# Patient Record
Sex: FEMALE | Race: WHITE | NOT HISPANIC OR LATINO | Employment: FULL TIME | URBAN - METROPOLITAN AREA
[De-identification: names, ages, dates, MRNs, and addresses within clinical notes are randomized per-mention and may not be internally consistent; named-entity substitution may affect disease eponyms.]

---

## 2017-03-20 ENCOUNTER — HOSPITAL ENCOUNTER (EMERGENCY)
Facility: HOSPITAL | Age: 27
Discharge: HOME | End: 2017-03-20
Attending: EMERGENCY MEDICINE | Admitting: EMERGENCY MEDICINE

## 2017-03-20 ENCOUNTER — APPOINTMENT (OUTPATIENT)
Dept: GENERAL RADIOLOGY | Facility: HOSPITAL | Age: 27
End: 2017-03-20

## 2017-03-20 VITALS
SYSTOLIC BLOOD PRESSURE: 120 MMHG | RESPIRATION RATE: 14 BRPM | OXYGEN SATURATION: 96 % | DIASTOLIC BLOOD PRESSURE: 81 MMHG | HEIGHT: 64 IN | TEMPERATURE: 97.8 F | WEIGHT: 133 LBS | HEART RATE: 82 BPM | BODY MASS INDEX: 22.71 KG/M2

## 2017-03-20 DIAGNOSIS — S90.852A FOREIGN BODY IN FOOT, LEFT, INITIAL ENCOUNTER: Primary | ICD-10-CM

## 2017-03-20 LAB
B-HCG UR QL: NEGATIVE
INTERNAL NEGATIVE CONTROL: NEGATIVE
INTERNAL POSITIVE CONTROL: POSITIVE
Lab: NORMAL

## 2017-03-20 PROCEDURE — 73630 X-RAY EXAM OF FOOT: CPT

## 2017-03-20 PROCEDURE — 99282 EMERGENCY DEPT VISIT SF MDM: CPT

## 2017-03-20 RX ORDER — DOXYCYCLINE 100 MG/1
100 CAPSULE ORAL 2 TIMES DAILY
Qty: 10 CAPSULE | Refills: 0 | Status: SHIPPED | OUTPATIENT
Start: 2017-03-20 | End: 2020-08-31

## 2017-03-20 NOTE — ED PROVIDER NOTES
Subjective   Patient is a 26 y.o. female presenting with foreign body.   History provided by:  Patient   used: No    Foreign Body   Intake: left foot.  Thinks stepped on broken beer bottle glass on sidewalk.  Suspected object:  Glass  Pain quality:  Sharp  Pain severity:  Mild  Duration:  2 days  Timing:  Constant  Progression:  Unchanged  Chronicity:  New  Exacerbated by: walking.  Ineffective treatments: pt has tried to dig out herself but can not visualize at all.   Associated symptoms comment:  None        Review of Systems   Constitutional: Negative for chills and fever.   Respiratory: Negative for chest tightness.    Cardiovascular: Negative for chest pain and palpitations.   All other systems reviewed and are negative.      History reviewed. No pertinent past medical history.    Allergies   Allergen Reactions   • Penicillins Hives       Past Surgical History   Procedure Laterality Date   • Knee surgery         History reviewed. No pertinent family history.    Social History     Social History   • Marital status: Single     Spouse name: N/A   • Number of children: N/A   • Years of education: N/A     Social History Main Topics   • Smoking status: Never Smoker   • Smokeless tobacco: None   • Alcohol use Yes   • Drug use: No   • Sexual activity: Defer     Other Topics Concern   • None     Social History Narrative   • None           Objective   Physical Exam   Constitutional: She is oriented to person, place, and time. She appears well-developed and well-nourished.   HENT:   Head: Normocephalic.   Right Ear: External ear normal.   Left Ear: External ear normal.   Nose: Nose normal.   Eyes: Conjunctivae and EOM are normal. Pupils are equal, round, and reactive to light.   Neck: Normal range of motion.   Musculoskeletal: Normal range of motion.   Neurological: She is alert and oriented to person, place, and time.   Skin: Skin is warm and dry.    Bottom of left foot towards 5th metatarsal noted to  have open wound but no redness induration.    Psychiatric: She has a normal mood and affect. Her behavior is normal. Judgment and thought content normal.       Procedures         ED Course  ED Course                  MDM      Final diagnoses:   Foreign body in foot, left, initial encounter            MARKEL Castro  03/21/17 2911

## 2018-05-01 ENCOUNTER — TRANSCRIBE ORDERS (OUTPATIENT)
Dept: LAB | Facility: HOSPITAL | Age: 28
End: 2018-05-01

## 2018-05-01 ENCOUNTER — LAB (OUTPATIENT)
Dept: LAB | Facility: HOSPITAL | Age: 28
End: 2018-05-01

## 2018-05-01 DIAGNOSIS — N18.9 CHRONIC KIDNEY DISEASE, UNSPECIFIED CKD STAGE: ICD-10-CM

## 2018-05-01 DIAGNOSIS — N18.9 CHRONIC KIDNEY DISEASE, UNSPECIFIED CKD STAGE: Primary | ICD-10-CM

## 2018-05-01 LAB
ALBUMIN SERPL-MCNC: 4 G/DL (ref 3.2–4.8)
ANION GAP SERPL CALCULATED.3IONS-SCNC: 6 MMOL/L (ref 3–11)
BACTERIA UR QL AUTO: ABNORMAL /HPF
BASOPHILS # BLD AUTO: 0.04 10*3/MM3 (ref 0–0.2)
BASOPHILS NFR BLD AUTO: 0.6 % (ref 0–1)
BILIRUB UR QL STRIP: NEGATIVE
BUN BLD-MCNC: 13 MG/DL (ref 9–23)
BUN/CREAT SERPL: 18.6 (ref 7–25)
CALCIUM SPEC-SCNC: 9.6 MG/DL (ref 8.7–10.4)
CHLORIDE SERPL-SCNC: 100 MMOL/L (ref 99–109)
CLARITY UR: CLEAR
CO2 SERPL-SCNC: 30 MMOL/L (ref 20–31)
COLOR UR: YELLOW
CREAT BLD-MCNC: 0.7 MG/DL (ref 0.6–1.3)
CREAT UR-MCNC: 98.8 MG/DL
DEPRECATED RDW RBC AUTO: 42.2 FL (ref 37–54)
EOSINOPHIL # BLD AUTO: 0.08 10*3/MM3 (ref 0–0.3)
EOSINOPHIL NFR BLD AUTO: 1.1 % (ref 0–3)
ERYTHROCYTE [DISTWIDTH] IN BLOOD BY AUTOMATED COUNT: 12.7 % (ref 11.3–14.5)
GFR SERPL CREATININE-BSD FRML MDRD: 101 ML/MIN/1.73
GLUCOSE BLD-MCNC: 81 MG/DL (ref 70–100)
GLUCOSE UR STRIP-MCNC: NEGATIVE MG/DL
HCT VFR BLD AUTO: 33.1 % (ref 34.5–44)
HGB BLD-MCNC: 11.3 G/DL (ref 11.5–15.5)
HGB UR QL STRIP.AUTO: ABNORMAL
HYALINE CASTS UR QL AUTO: ABNORMAL /LPF
IMM GRANULOCYTES # BLD: 0.01 10*3/MM3 (ref 0–0.03)
IMM GRANULOCYTES NFR BLD: 0.1 % (ref 0–0.6)
KETONES UR QL STRIP: NEGATIVE
LEUKOCYTE ESTERASE UR QL STRIP.AUTO: NEGATIVE
LYMPHOCYTES # BLD AUTO: 2.56 10*3/MM3 (ref 0.6–4.8)
LYMPHOCYTES NFR BLD AUTO: 35.7 % (ref 24–44)
MCH RBC QN AUTO: 30.9 PG (ref 27–31)
MCHC RBC AUTO-ENTMCNC: 34.1 G/DL (ref 32–36)
MCV RBC AUTO: 90.4 FL (ref 80–99)
MONOCYTES # BLD AUTO: 0.49 10*3/MM3 (ref 0–1)
MONOCYTES NFR BLD AUTO: 6.8 % (ref 0–12)
NEUTROPHILS # BLD AUTO: 4 10*3/MM3 (ref 1.5–8.3)
NEUTROPHILS NFR BLD AUTO: 55.7 % (ref 41–71)
NITRITE UR QL STRIP: NEGATIVE
PH UR STRIP.AUTO: <=5 [PH] (ref 5–8)
PHOSPHATE SERPL-MCNC: 4.9 MG/DL (ref 2.4–5.1)
PLATELET # BLD AUTO: 351 10*3/MM3 (ref 150–450)
PMV BLD AUTO: 9.4 FL (ref 6–12)
POTASSIUM BLD-SCNC: 4.2 MMOL/L (ref 3.5–5.5)
PROT UR QL STRIP: ABNORMAL
PROT UR-MCNC: 136 MG/DL (ref 1–14)
RBC # BLD AUTO: 3.66 10*6/MM3 (ref 3.89–5.14)
RBC # UR: ABNORMAL /HPF
REF LAB TEST METHOD: ABNORMAL
SODIUM BLD-SCNC: 136 MMOL/L (ref 132–146)
SP GR UR STRIP: 1.02 (ref 1–1.03)
SQUAMOUS #/AREA URNS HPF: ABNORMAL /HPF
UROBILINOGEN UR QL STRIP: ABNORMAL
WBC NRBC COR # BLD: 7.18 10*3/MM3 (ref 3.5–10.8)
WBC UR QL AUTO: ABNORMAL /HPF

## 2018-05-01 PROCEDURE — 36415 COLL VENOUS BLD VENIPUNCTURE: CPT

## 2018-05-01 PROCEDURE — 85025 COMPLETE CBC W/AUTO DIFF WBC: CPT

## 2018-05-01 PROCEDURE — 80069 RENAL FUNCTION PANEL: CPT

## 2018-05-01 PROCEDURE — 84156 ASSAY OF PROTEIN URINE: CPT

## 2018-05-01 PROCEDURE — 82570 ASSAY OF URINE CREATININE: CPT

## 2018-05-01 PROCEDURE — 81001 URINALYSIS AUTO W/SCOPE: CPT

## 2018-06-11 ENCOUNTER — LAB (OUTPATIENT)
Dept: LAB | Facility: HOSPITAL | Age: 28
End: 2018-06-11

## 2018-06-11 ENCOUNTER — TRANSCRIBE ORDERS (OUTPATIENT)
Dept: LAB | Facility: HOSPITAL | Age: 28
End: 2018-06-11

## 2018-06-11 DIAGNOSIS — I13.10 BENIGN HYPERTENSIVE HEART AND RENAL DISEASE: Primary | ICD-10-CM

## 2018-06-11 DIAGNOSIS — I13.10 BENIGN HYPERTENSIVE HEART AND RENAL DISEASE: ICD-10-CM

## 2018-06-11 LAB
ALBUMIN SERPL-MCNC: 3.81 G/DL (ref 3.2–4.8)
ANION GAP SERPL CALCULATED.3IONS-SCNC: 10 MMOL/L (ref 3–11)
BACTERIA UR QL AUTO: ABNORMAL /HPF
BASOPHILS # BLD AUTO: 0.03 10*3/MM3 (ref 0–0.2)
BASOPHILS NFR BLD AUTO: 0.4 % (ref 0–1)
BILIRUB UR QL STRIP: NEGATIVE
BUN BLD-MCNC: 12 MG/DL (ref 9–23)
BUN/CREAT SERPL: 16.9 (ref 7–25)
CALCIUM SPEC-SCNC: 8.2 MG/DL (ref 8.7–10.4)
CHLORIDE SERPL-SCNC: 101 MMOL/L (ref 99–109)
CLARITY UR: CLEAR
CO2 SERPL-SCNC: 28 MMOL/L (ref 20–31)
COLOR UR: YELLOW
CREAT BLD-MCNC: 0.71 MG/DL (ref 0.6–1.3)
CREAT UR-MCNC: 151.6 MG/DL
DEPRECATED RDW RBC AUTO: 45.1 FL (ref 37–54)
EOSINOPHIL # BLD AUTO: 0.09 10*3/MM3 (ref 0–0.3)
EOSINOPHIL NFR BLD AUTO: 1.1 % (ref 0–3)
ERYTHROCYTE [DISTWIDTH] IN BLOOD BY AUTOMATED COUNT: 13.5 % (ref 11.3–14.5)
GFR SERPL CREATININE-BSD FRML MDRD: 100 ML/MIN/1.73
GLUCOSE BLD-MCNC: 89 MG/DL (ref 70–100)
GLUCOSE UR STRIP-MCNC: NEGATIVE MG/DL
HCT VFR BLD AUTO: 33.4 % (ref 34.5–44)
HGB BLD-MCNC: 11.2 G/DL (ref 11.5–15.5)
HGB UR QL STRIP.AUTO: ABNORMAL
HYALINE CASTS UR QL AUTO: ABNORMAL /LPF
IMM GRANULOCYTES # BLD: 0.01 10*3/MM3 (ref 0–0.03)
IMM GRANULOCYTES NFR BLD: 0.1 % (ref 0–0.6)
KETONES UR QL STRIP: NEGATIVE
LEUKOCYTE ESTERASE UR QL STRIP.AUTO: NEGATIVE
LYMPHOCYTES # BLD AUTO: 3.03 10*3/MM3 (ref 0.6–4.8)
LYMPHOCYTES NFR BLD AUTO: 38.5 % (ref 24–44)
MCH RBC QN AUTO: 30.9 PG (ref 27–31)
MCHC RBC AUTO-ENTMCNC: 33.5 G/DL (ref 32–36)
MCV RBC AUTO: 92 FL (ref 80–99)
MONOCYTES # BLD AUTO: 0.47 10*3/MM3 (ref 0–1)
MONOCYTES NFR BLD AUTO: 6 % (ref 0–12)
MUCOUS THREADS URNS QL MICRO: ABNORMAL /HPF
NEUTROPHILS # BLD AUTO: 4.24 10*3/MM3 (ref 1.5–8.3)
NEUTROPHILS NFR BLD AUTO: 54 % (ref 41–71)
NITRITE UR QL STRIP: NEGATIVE
PH UR STRIP.AUTO: 5.5 [PH] (ref 5–8)
PHOSPHATE SERPL-MCNC: 2.8 MG/DL (ref 2.4–5.1)
PLATELET # BLD AUTO: 296 10*3/MM3 (ref 150–450)
PMV BLD AUTO: 9.7 FL (ref 6–12)
POTASSIUM BLD-SCNC: 3.6 MMOL/L (ref 3.5–5.5)
PROT UR QL STRIP: ABNORMAL
PROT UR-MCNC: 192 MG/DL (ref 1–14)
RBC # BLD AUTO: 3.63 10*6/MM3 (ref 3.89–5.14)
RBC # UR: ABNORMAL /HPF
REF LAB TEST METHOD: ABNORMAL
SODIUM BLD-SCNC: 139 MMOL/L (ref 132–146)
SP GR UR STRIP: 1.02 (ref 1–1.03)
SQUAMOUS #/AREA URNS HPF: ABNORMAL /HPF
UROBILINOGEN UR QL STRIP: ABNORMAL
WBC NRBC COR # BLD: 7.86 10*3/MM3 (ref 3.5–10.8)
WBC UR QL AUTO: ABNORMAL /HPF
YEAST URNS QL MICRO: ABNORMAL /HPF

## 2018-06-11 PROCEDURE — 82570 ASSAY OF URINE CREATININE: CPT

## 2018-06-11 PROCEDURE — 80069 RENAL FUNCTION PANEL: CPT

## 2018-06-11 PROCEDURE — 84156 ASSAY OF PROTEIN URINE: CPT

## 2018-06-11 PROCEDURE — 85025 COMPLETE CBC W/AUTO DIFF WBC: CPT

## 2018-06-11 PROCEDURE — 81001 URINALYSIS AUTO W/SCOPE: CPT

## 2018-06-11 PROCEDURE — 36415 COLL VENOUS BLD VENIPUNCTURE: CPT

## 2018-06-12 ENCOUNTER — TRANSCRIBE ORDERS (OUTPATIENT)
Dept: ADMINISTRATIVE | Facility: HOSPITAL | Age: 28
End: 2018-06-12

## 2018-06-12 DIAGNOSIS — N18.1 STAGE 1 CHRONIC KIDNEY DISEASE: Primary | ICD-10-CM

## 2018-07-12 ENCOUNTER — HOSPITAL ENCOUNTER (OUTPATIENT)
Dept: CT IMAGING | Facility: HOSPITAL | Age: 28
Discharge: HOME | End: 2018-07-13
Attending: INTERNAL MEDICINE | Admitting: INTERNAL MEDICINE

## 2018-07-12 DIAGNOSIS — N18.1 STAGE 1 CHRONIC KIDNEY DISEASE: ICD-10-CM

## 2018-07-12 PROBLEM — R80.9 PROTEINURIA: Status: ACTIVE | Noted: 2018-07-12

## 2018-07-12 LAB
ABO GROUP BLD: NORMAL
ABO GROUP BLD: NORMAL
ALBUMIN SERPL-MCNC: 3.92 G/DL (ref 3.2–4.8)
ANION GAP SERPL CALCULATED.3IONS-SCNC: 3 MMOL/L (ref 3–11)
APTT PPP: 27.3 SECONDS (ref 24–31)
B-HCG UR QL: NEGATIVE
BLD GP AB SCN SERPL QL: NEGATIVE
BUN BLD-MCNC: 13 MG/DL (ref 9–23)
BUN/CREAT SERPL: 18.6 (ref 7–25)
CALCIUM SPEC-SCNC: 8.9 MG/DL (ref 8.7–10.4)
CHLORIDE SERPL-SCNC: 110 MMOL/L (ref 99–109)
CLOSURE TME COLL+ADP BLD: 114 SECONDS (ref 54–123)
CLOSURE TME COLL+EPINEP BLD: 103 SECONDS (ref 72–192)
CO2 SERPL-SCNC: 27 MMOL/L (ref 20–31)
CREAT BLD-MCNC: 0.7 MG/DL (ref 0.6–1.3)
DEPRECATED RDW RBC AUTO: 42.5 FL (ref 37–54)
ERYTHROCYTE [DISTWIDTH] IN BLOOD BY AUTOMATED COUNT: 12.7 % (ref 11.3–14.5)
GFR SERPL CREATININE-BSD FRML MDRD: 101 ML/MIN/1.73
GLUCOSE BLD-MCNC: 89 MG/DL (ref 70–100)
HCT VFR BLD AUTO: 37.1 % (ref 34.5–44)
HGB BLD-MCNC: 11.9 G/DL (ref 11.5–15.5)
HGB BLD-MCNC: 12.5 G/DL (ref 11.5–15.5)
INR PPP: 0.91 (ref 0.91–1.09)
MCH RBC QN AUTO: 30.8 PG (ref 27–31)
MCHC RBC AUTO-ENTMCNC: 33.7 G/DL (ref 32–36)
MCV RBC AUTO: 91.4 FL (ref 80–99)
PHOSPHATE SERPL-MCNC: 3.8 MG/DL (ref 2.4–5.1)
PLATELET # BLD AUTO: 292 10*3/MM3 (ref 150–450)
PMV BLD AUTO: 9.7 FL (ref 6–12)
POTASSIUM BLD-SCNC: 4.2 MMOL/L (ref 3.5–5.5)
PROTHROMBIN TIME: 9.6 SECONDS (ref 9.6–11.5)
RBC # BLD AUTO: 4.06 10*6/MM3 (ref 3.89–5.14)
RH BLD: POSITIVE
RH BLD: POSITIVE
SODIUM BLD-SCNC: 140 MMOL/L (ref 132–146)
T&S EXPIRATION DATE: NORMAL
WBC NRBC COR # BLD: 5.27 10*3/MM3 (ref 3.5–10.8)

## 2018-07-12 PROCEDURE — G0378 HOSPITAL OBSERVATION PER HR: HCPCS

## 2018-07-12 PROCEDURE — 88305 TISSUE EXAM BY PATHOLOGIST: CPT | Performed by: INTERNAL MEDICINE

## 2018-07-12 PROCEDURE — 86901 BLOOD TYPING SEROLOGIC RH(D): CPT

## 2018-07-12 PROCEDURE — 85730 THROMBOPLASTIN TIME PARTIAL: CPT | Performed by: INTERNAL MEDICINE

## 2018-07-12 PROCEDURE — 88350 IMFLUOR EA ADDL 1ANTB STN PX: CPT | Performed by: INTERNAL MEDICINE

## 2018-07-12 PROCEDURE — 85576 BLOOD PLATELET AGGREGATION: CPT | Performed by: INTERNAL MEDICINE

## 2018-07-12 PROCEDURE — 85027 COMPLETE CBC AUTOMATED: CPT | Performed by: INTERNAL MEDICINE

## 2018-07-12 PROCEDURE — 88348 ELECTRON MICROSCOPY DX: CPT | Performed by: INTERNAL MEDICINE

## 2018-07-12 PROCEDURE — 88313 SPECIAL STAINS GROUP 2: CPT | Performed by: INTERNAL MEDICINE

## 2018-07-12 PROCEDURE — 85610 PROTHROMBIN TIME: CPT | Performed by: INTERNAL MEDICINE

## 2018-07-12 PROCEDURE — 86900 BLOOD TYPING SEROLOGIC ABO: CPT

## 2018-07-12 PROCEDURE — 81025 URINE PREGNANCY TEST: CPT | Performed by: INTERNAL MEDICINE

## 2018-07-12 PROCEDURE — 77012 CT SCAN FOR NEEDLE BIOPSY: CPT

## 2018-07-12 PROCEDURE — 88346 IMFLUOR 1ST 1ANTB STAIN PX: CPT | Performed by: INTERNAL MEDICINE

## 2018-07-12 PROCEDURE — 80069 RENAL FUNCTION PANEL: CPT | Performed by: INTERNAL MEDICINE

## 2018-07-12 PROCEDURE — 86850 RBC ANTIBODY SCREEN: CPT | Performed by: INTERNAL MEDICINE

## 2018-07-12 PROCEDURE — 86900 BLOOD TYPING SEROLOGIC ABO: CPT | Performed by: INTERNAL MEDICINE

## 2018-07-12 PROCEDURE — 86901 BLOOD TYPING SEROLOGIC RH(D): CPT | Performed by: INTERNAL MEDICINE

## 2018-07-12 PROCEDURE — 85018 HEMOGLOBIN: CPT | Performed by: INTERNAL MEDICINE

## 2018-07-12 RX ORDER — SODIUM CHLORIDE 0.9 % (FLUSH) 0.9 %
1-10 SYRINGE (ML) INJECTION AS NEEDED
Status: DISCONTINUED | OUTPATIENT
Start: 2018-07-12 | End: 2018-07-13 | Stop reason: HOSPADM

## 2018-07-12 RX ORDER — ACETAMINOPHEN 325 MG/1
650 TABLET ORAL EVERY 4 HOURS PRN
Status: DISCONTINUED | OUTPATIENT
Start: 2018-07-12 | End: 2018-07-13 | Stop reason: HOSPADM

## 2018-07-12 RX ORDER — HYDROCODONE BITARTRATE AND ACETAMINOPHEN 5; 325 MG/1; MG/1
1 TABLET ORAL EVERY 4 HOURS PRN
Status: DISCONTINUED | OUTPATIENT
Start: 2018-07-12 | End: 2018-07-13 | Stop reason: HOSPADM

## 2018-07-12 NOTE — PROCEDURES
NAL CT Guided Renal Biopsy-Post Procedure Note    Pre-Procedure Diagnosis: Proteinuria     Procedure Performed: Kidney Biopsy, CT Guide  Left    Nephrologist: Aron Do MD    Anesthesia:  Local 1% Lidocaine    Technique: Percutaneous CT guided biopsy      Description of Procedure:   After informed consent, time out and site ID, the patient was placed in prone position. The back was prepped and draped in sterile fashion. After appropriate anesthesia the biopsy needle was inserted into kidney with CT guidance. There were 3 passes yielding 3 core(s).    Post Biopsy scan was reviewed - no bleeding observed.     Patient tolerated procedure well     EBL:   none    Specimens removed: renal tissues    Complications: none    Aron Do MD  07/12/18  12:54 PM

## 2018-07-12 NOTE — H&P
Patient Care Team:  No Known Provider as PCP - General    Chief complaint Proteinuria     Subjective     History of Present Illness   27 year old female with hx of protienuria. She is here for CT guided renal biopsy. She denies any fever, chills, rigors, headache, nausea, vomiting, diarrhea, constipation, joint pain or rash, hemoptysis or epistasis. She has not taken Nsaids. No further complaints.     Review of Systems   Constitutional: Negative.    Eyes: Negative.    Respiratory: Negative.    Cardiovascular: Negative.    Gastrointestinal: Negative.    Endocrine: Negative.    Genitourinary: Negative.    Musculoskeletal: Negative.    Skin: Negative.         Past Medical History:   Diagnosis Date   • Chronic kidney disease     HAS HAD INTERMITTENT PROTEINURIA AND HEMATURIA SINCE 8TH GRADE   • Rash     RECENT rash on legs -ankles to knees     Past Surgical History:   Procedure Laterality Date   • KNEE SURGERY     • KNEE SURGERY Right 2014    IMPACT KNEE SURGERY   • SKIN BIOPSY      of rash on legs   • THUMB ARTHROSCOPY      hammer thumb     No family history on file.  Social History   Substance Use Topics   • Smoking status: Never Smoker   • Smokeless tobacco: Not on file   • Alcohol use Yes     Prescriptions Prior to Admission   Medication Sig Dispense Refill Last Dose   • doxycycline (MONODOX) 100 MG capsule Take 1 capsule by mouth 2 (Two) Times a Day. 10 capsule 0      Allergies:  Penicillins    Objective      Vital Signs  Temp:  [97.7 °F (36.5 °C)] 97.7 °F (36.5 °C)  Heart Rate:  [63-93] 93  Resp:  [16-20] 20  BP: (108-145)/(62-73) 145/73    Physical Exam   Constitutional: She is oriented to person, place, and time. She appears well-developed and well-nourished.   HENT:   Head: Normocephalic and atraumatic.   Eyes: EOM are normal. Pupils are equal, round, and reactive to light.   Neck: Normal range of motion. Neck supple.   Cardiovascular: Normal rate and regular rhythm.    Pulmonary/Chest: Effort normal and  breath sounds normal.   Abdominal: Soft. Bowel sounds are normal.   Musculoskeletal: Normal range of motion.   Neurological: She is alert and oriented to person, place, and time.   Skin: Skin is warm and dry.   Psychiatric: She has a normal mood and affect. Her behavior is normal.       Results Review:   I reviewed the patient's new clinical results.      Assessment/Plan     Active Problems:    Proteinuria      Assessment & Plan  Will go ahead with renal biopsy today.     I discussed the patients findings and my recommendations with patient    Aron Do MD  07/12/18  12:50 PM

## 2018-07-12 NOTE — PLAN OF CARE
Problem: Urine Elimination Impaired (Pediatric)  Goal: Identify Related Risk Factors and Signs and Symptoms  Outcome: Ongoing (interventions implemented as appropriate)    Goal: Effective or Improved Urinary Elimination  Outcome: Ongoing (interventions implemented as appropriate)    Goal: Effective Containment of Urine  Outcome: Ongoing (interventions implemented as appropriate)    Goal: Reduced Incontinence Episodes  Outcome: Ongoing (interventions implemented as appropriate)      Problem: Patient Care Overview  Goal: Plan of Care Review  Outcome: Ongoing (interventions implemented as appropriate)   07/12/18 1919   OTHER   Outcome Summary renal biopsy. going home tomorrow. vss will continue to monitor     Goal: Individualization and Mutuality  Outcome: Ongoing (interventions implemented as appropriate)    Goal: Discharge Needs Assessment  Outcome: Ongoing (interventions implemented as appropriate)    Goal: Interprofessional Rounds/Family Conf  Outcome: Ongoing (interventions implemented as appropriate)

## 2018-07-13 ENCOUNTER — TELEPHONE (OUTPATIENT)
Dept: INFUSION THERAPY | Facility: HOSPITAL | Age: 28
End: 2018-07-13

## 2018-07-13 VITALS
BODY MASS INDEX: 22.88 KG/M2 | RESPIRATION RATE: 20 BRPM | OXYGEN SATURATION: 100 % | HEART RATE: 89 BPM | DIASTOLIC BLOOD PRESSURE: 71 MMHG | SYSTOLIC BLOOD PRESSURE: 110 MMHG | WEIGHT: 134 LBS | HEIGHT: 64 IN | TEMPERATURE: 97.8 F

## 2018-07-13 LAB
ANION GAP SERPL CALCULATED.3IONS-SCNC: 5 MMOL/L (ref 3–11)
BASOPHILS # BLD AUTO: 0.03 10*3/MM3 (ref 0–0.2)
BASOPHILS NFR BLD AUTO: 0.5 % (ref 0–1)
BUN BLD-MCNC: 14 MG/DL (ref 9–23)
BUN/CREAT SERPL: 18.9 (ref 7–25)
CALCIUM SPEC-SCNC: 8.5 MG/DL (ref 8.7–10.4)
CHLORIDE SERPL-SCNC: 108 MMOL/L (ref 99–109)
CO2 SERPL-SCNC: 26 MMOL/L (ref 20–31)
CREAT BLD-MCNC: 0.74 MG/DL (ref 0.6–1.3)
DEPRECATED RDW RBC AUTO: 42.6 FL (ref 37–54)
EOSINOPHIL # BLD AUTO: 0.07 10*3/MM3 (ref 0–0.3)
EOSINOPHIL NFR BLD AUTO: 1.2 % (ref 0–3)
ERYTHROCYTE [DISTWIDTH] IN BLOOD BY AUTOMATED COUNT: 12.8 % (ref 11.3–14.5)
GFR SERPL CREATININE-BSD FRML MDRD: 95 ML/MIN/1.73
GLUCOSE BLD-MCNC: 93 MG/DL (ref 70–100)
HCT VFR BLD AUTO: 35.6 % (ref 34.5–44)
HGB BLD-MCNC: 12 G/DL (ref 11.5–15.5)
IMM GRANULOCYTES # BLD: 0.01 10*3/MM3 (ref 0–0.03)
IMM GRANULOCYTES NFR BLD: 0.2 % (ref 0–0.6)
LYMPHOCYTES # BLD AUTO: 1.78 10*3/MM3 (ref 0.6–4.8)
LYMPHOCYTES NFR BLD AUTO: 30.4 % (ref 24–44)
MCH RBC QN AUTO: 30.7 PG (ref 27–31)
MCHC RBC AUTO-ENTMCNC: 33.7 G/DL (ref 32–36)
MCV RBC AUTO: 91 FL (ref 80–99)
MONOCYTES # BLD AUTO: 0.44 10*3/MM3 (ref 0–1)
MONOCYTES NFR BLD AUTO: 7.5 % (ref 0–12)
NEUTROPHILS # BLD AUTO: 3.54 10*3/MM3 (ref 1.5–8.3)
NEUTROPHILS NFR BLD AUTO: 60.4 % (ref 41–71)
PLATELET # BLD AUTO: 287 10*3/MM3 (ref 150–450)
PMV BLD AUTO: 10 FL (ref 6–12)
POTASSIUM BLD-SCNC: 4 MMOL/L (ref 3.5–5.5)
RBC # BLD AUTO: 3.91 10*6/MM3 (ref 3.89–5.14)
SODIUM BLD-SCNC: 139 MMOL/L (ref 132–146)
WBC NRBC COR # BLD: 5.86 10*3/MM3 (ref 3.5–10.8)

## 2018-07-13 PROCEDURE — 80048 BASIC METABOLIC PNL TOTAL CA: CPT | Performed by: INTERNAL MEDICINE

## 2018-07-13 PROCEDURE — 85025 COMPLETE CBC W/AUTO DIFF WBC: CPT | Performed by: INTERNAL MEDICINE

## 2018-07-13 PROCEDURE — G0378 HOSPITAL OBSERVATION PER HR: HCPCS

## 2018-07-13 NOTE — DISCHARGE INSTRUCTIONS
dont lift more than 10 lbs  Avoid any strenous activity  Till 10 days  appt next week with labs  If see any blood in urine or have abd or back pain call our office or ER please

## 2018-07-13 NOTE — DISCHARGE SUMMARY
"NAL Progress Note  Herman Morgan  [id number]  1990 7/12/2018    Reason for visit:  Renal biopsy    ROS:    Pulm:  No SOA  CV:  No CP    I&O:    Intake/Output Summary (Last 24 hours) at 07/13/18 1204  Last data filed at 07/13/18 0438   Gross per 24 hour   Intake             1300 ml   Output                0 ml   Net             1300 ml       PE:   Blood pressure 110/71, pulse 89, temperature 97.8 °F (36.6 °C), temperature source Oral, resp. rate 20, height 162.6 cm (64\"), weight 60.8 kg (134 lb), SpO2 100 %.    GENERAL: Awake and alert, in no acute distress.   HEENT: PER, No conjunctivitis  NECK: Supple, no JVD     HEART: RRR; No murmur, rubs, gallops.   LUNGS: Clear to auscultation bilaterally without wheezing, rales, rhonchi. Normal respiratory effort. Nonlabored. No dullness.  ABDOMEN: Soft, nontender, nondistended. Positive bowel sounds. No rebound or guarding. NO mass or HSM.  EXT:  No cyanosis, clubbing or edema. No cord.  : Genitalia generally unremarkable.  Without Gaffney catheter.  SKIN: Warm and dry without cutaneous eruptions on Inspection/palpation.    NEURO: Alert and oriented x 3, Motor 5/5 bilaterally    Medications:    Current Facility-Administered Medications:   •  acetaminophen (TYLENOL) tablet 650 mg, 650 mg, Oral, Q4H PRN, Aron Do MD  •  HYDROcodone-acetaminophen (NORCO) 5-325 MG per tablet 1 tablet, 1 tablet, Oral, Q4H PRN, Aron Do MD  •  sodium chloride 0.9 % flush 1-10 mL, 1-10 mL, Intravenous, PRN, Aron Do MD    Meds reviewed and doses adjusted for renal function    Labs:    Results from last 7 days  Lab Units 07/13/18  0724 07/12/18  1551 07/12/18  0909   WBC 10*3/mm3 5.86  --  5.27   HEMOGLOBIN g/dL 12.0 11.9 12.5   HEMATOCRIT % 35.6  --  37.1   PLATELETS 10*3/mm3 287  --  292   SODIUM mmol/L 139  --  140   POTASSIUM mmol/L 4.0  --  4.2   CHLORIDE mmol/L 108  --  110*   CO2 mmol/L 26.0  --  27.0   BUN mg/dL 14  --  13   CREATININE mg/dL 0.74 "  --  0.70   GLUCOSE mg/dL 93  --  89   CALCIUM mg/dL 8.5*  --  8.9   PHOSPHORUS mg/dL  --   --  3.8             Radiology:  Imaging Results (last 72 hours)     Procedure Component Value Units Date/Time    CT Needle Biopsy Kidney Renal [[id number]] Collected:  07/12/18 1330     Updated:  07/12/18 1626    Narrative:       EXAMINATION: CT NEEDLE BIOPSY KIDNEY RENAL- 07/12/2018     INDICATION: N18.1-Chronic kidney disease, stage 1; renal insufficiency     TECHNIQUE: Limited CT imaging was provided of the abdomen for  localization of the lower pole of the left kidney.     The radiation dose reduction device was turned on for each scan per the  ALARA (As Low as Reasonably Achievable) protocol.     COMPARISON: NONE     FINDINGS: The kidneys are both unremarkable in their visualized  portions. The liver is homogeneous. No stones in the visualized portion  of the gallbladder. The pancreas is homogeneous. Gastrointestinal tract  within normal limits. There is localization lower pole of the left  kidney with dimension of approximately 5.3 cm from skin to the lower  pole left kidney. There is no significant postbiopsy hemorrhage.       Impression:       Limited CT imaging provided for localization lower pole of  the left kidney. Please see the procedure report for full details.       D:  07/12/2018  E:  07/12/2018     This report was finalized on 7/12/2018 4:24 PM by Dr. Lizzy Garduno MD.             Renal Imaging:        IMPRESSION:  Proteinuria  CKD  S/p biopsy- hct stable\  Hemodynamically stable      RECOMMENDATIONS:  Ok for dc  Follow up in clinic 1 week  No weight lifting more than 10 lbs  No blood thinners     Stephan Barrios MD  7/13/2018  12:04 PM

## 2018-07-13 NOTE — PROGRESS NOTES
Discharge Planning Assessment  [facility]     Patient Name: Herman Morgan  MRN: [id number]  Today's Date: 7/13/2018    Admit Date: 7/12/2018          Discharge Needs Assessment     Row Name 07/13/18 1013       Living Environment    Lives With spouse    Current Living Arrangements home/apartment/condo   Lives in [facility]    Primary Care Provided by self    Provides Primary Care For no one    Family Caregiver if Needed spouse    Quality of Family Relationships helpful;involved;supportive    Able to Return to Prior Arrangements yes       Resource/Environmental Concerns    Resource/Environmental Concerns none    Transportation Concerns car, none       Transition Planning    Patient/Family Anticipates Transition to home with family    Patient/Family Anticipated Services at Transition none    Transportation Anticipated family or friend will provide       Discharge Needs Assessment    Concerns to be Addressed other (see comments)   PCP    Equipment Currently Used at Home none    Anticipated Changes Related to Illness none    Equipment Needed After Discharge none            Discharge Plan     Row Name 07/13/18 1013       Plan    Plan Home with spouse    Patient/Family in Agreement with Plan yes    Plan Comments Met with pt at . She is independent of ADL's. No DME. She does not have a PCP and is agreeable for CM to arrange one. Called Sha[facility] transitional care nurse and she has arranged an appt for Monday 7/16 at 10 am with Samia Benitez in [facility]. Appt placed in AVS. No further d/c needs. CM will cont to follow.     Final Discharge Disposition Code 01 - home or self-care        Destination     No service coordination in this encounter.      Durable Medical Equipment     No service coordination in this encounter.      Dialysis/Infusion     No service coordination in this encounter.      Home Medical Care     No service coordination in this encounter.      Social Care     No service coordination in this  encounter.        Expected Discharge Date and Time     Expected Discharge Date Expected Discharge Time    Jul 13, 2018               Demographic Summary     Row Name 07/13/18 1011       General Information    Referral Source admission list    Preferred Language English    General Information Comments No PCP at this time       Contact Information    Permission Granted to Share Info With             Functional Status     Row Name 07/13/18 1012       Functional Status    Usual Activity Tolerance excellent    Current Activity Tolerance excellent       Functional Status, IADL    Medications independent    Meal Preparation independent    Housekeeping independent    Laundry independent    Shopping independent       Employment/    Employment Status employed full time    Employment/ Comments Has [employer] PPO with no concerns            Psychosocial    No documentation.           Abuse/Neglect    No documentation.           Legal    No documentation.           Substance Abuse    No documentation.           Patient Forms    No documentation.         Simi Acharya

## 2018-07-13 NOTE — PLAN OF CARE
Problem: Patient Care Overview  Goal: Plan of Care Review  Outcome: Ongoing (interventions implemented as appropriate)   07/13/18 0647   OTHER   Outcome Summary Pt rested well through the night. No c/o pain or discomfort. VSS. RA. Will continue to monitor.    Coping/Psychosocial   Plan of Care Reviewed With patient   Plan of Care Review   Progress improving

## 2018-07-16 ENCOUNTER — TELEPHONE (OUTPATIENT)
Dept: INTERNAL MEDICINE | Facility: CLINIC | Age: 28
End: 2018-07-16

## 2018-07-16 NOTE — TELEPHONE ENCOUNTER
Contacted pt in regards to new PCP appt 7/16/18 coordinated with Samia GOODWIN at the [facility] location.  Spoke with patient who indicates she was unaware of appt today that she missed.  Offered to reschedule an appt to est care with a [facility] [place] however patient requested [facility] Coordinator's contact information and states she will call back in the future should she decide to establish care.  I provided pt with my contact information and encouraged her to call in the future for primary care scheduling.  The patient voiced appreciation for the call and voiced no additional needs at this time.

## 2018-07-26 LAB
LAB AP CASE REPORT: NORMAL
PATH REPORT.FINAL DX SPEC: NORMAL

## 2021-04-16 ENCOUNTER — BULK ORDERING (OUTPATIENT)
Dept: CASE MANAGEMENT | Facility: OTHER | Age: 31
End: 2021-04-16

## 2021-04-16 DIAGNOSIS — Z23 IMMUNIZATION DUE: ICD-10-CM
